# Patient Record
Sex: FEMALE | Race: WHITE | NOT HISPANIC OR LATINO | URBAN - METROPOLITAN AREA
[De-identification: names, ages, dates, MRNs, and addresses within clinical notes are randomized per-mention and may not be internally consistent; named-entity substitution may affect disease eponyms.]

---

## 2018-07-12 ENCOUNTER — RECORDS - HEALTHEAST (OUTPATIENT)
Dept: LAB | Facility: CLINIC | Age: 23
End: 2018-07-12

## 2018-07-13 LAB — C TRACH DNA SPEC QL PROBE+SIG AMP: NEGATIVE

## 2019-07-18 ENCOUNTER — RECORDS - HEALTHEAST (OUTPATIENT)
Dept: LAB | Facility: CLINIC | Age: 24
End: 2019-07-18

## 2019-07-19 LAB — C TRACH DNA SPEC QL PROBE+SIG AMP: NEGATIVE

## 2022-10-13 ENCOUNTER — LAB REQUISITION (OUTPATIENT)
Dept: LAB | Facility: CLINIC | Age: 27
End: 2022-10-13
Payer: COMMERCIAL

## 2022-10-13 DIAGNOSIS — Z01.419 ENCOUNTER FOR GYNECOLOGICAL EXAMINATION (GENERAL) (ROUTINE) WITHOUT ABNORMAL FINDINGS: ICD-10-CM

## 2022-10-13 PROCEDURE — G0145 SCR C/V CYTO,THINLAYER,RESCR: HCPCS | Mod: ORL | Performed by: PHYSICIAN ASSISTANT

## 2022-10-18 LAB
BKR LAB AP GYN ADEQUACY: NORMAL
BKR LAB AP GYN INTERPRETATION: NORMAL
BKR LAB AP HPV REFLEX: NORMAL
BKR LAB AP LMP: NORMAL
BKR LAB AP PREVIOUS ABNORMAL: NORMAL
PATH REPORT.COMMENTS IMP SPEC: NORMAL
PATH REPORT.COMMENTS IMP SPEC: NORMAL
PATH REPORT.RELEVANT HX SPEC: NORMAL

## 2023-01-31 ENCOUNTER — TRANSCRIBE ORDERS (OUTPATIENT)
Dept: OTHER | Age: 28
End: 2023-01-31

## 2023-01-31 DIAGNOSIS — R32 INCONTINENCE IN FEMALE: Primary | ICD-10-CM

## 2023-03-02 ENCOUNTER — THERAPY VISIT (OUTPATIENT)
Dept: PHYSICAL THERAPY | Facility: CLINIC | Age: 28
End: 2023-03-02
Attending: PHYSICIAN ASSISTANT
Payer: COMMERCIAL

## 2023-03-02 DIAGNOSIS — R32 INCONTINENCE IN FEMALE: ICD-10-CM

## 2023-03-02 DIAGNOSIS — M99.05 SOMATIC DYSFUNCTION OF PELVIC REGION: Primary | ICD-10-CM

## 2023-03-02 PROCEDURE — 97535 SELF CARE MNGMENT TRAINING: CPT | Mod: GP

## 2023-03-02 PROCEDURE — 97161 PT EVAL LOW COMPLEX 20 MIN: CPT | Mod: GP

## 2023-03-02 PROCEDURE — 97110 THERAPEUTIC EXERCISES: CPT | Mod: GP

## 2023-03-02 NOTE — PROGRESS NOTES
Physical Therapy Initial Evaluation  Subjective:  Joy reports noticing that her underwear has been damp after rock climbing and has felt UI when belaying. This started in Nov 2022, unsure of anything that could have caused it. She has been climbing w/out UI before then. She is indoor top-rope climbing at the gym for about 2 hr, leaking in the middle or more towards the end of 2 hr. Denies feeling a full bladder or urgency during. Is not necessarily happening w/ stress (such as a dyno or big push) during climbing.      Exercise: Climbing at VE 1x/wk, walks dog 2x/day, 10 min fitness workouts 2x/wk (HIIT, 5# weights). Does not leak w/ HIIT training  Goals: Not leak while rock climbing    Urination:  Do you leak on the way to the bathroom or with a strong urge to void? n   Do you leak with cough,sneeze, jumping, running? n  Any other activities that cause leaking?  Rock climbing  Do you have triggers that make you feel you can't wait to go to the bathroom?  What are they? n  Type of pad and number used per day? Did not answer  When you leak what is the amount? med  How long can you delay the need to urinate? An hour  Frequency of daytime urination: every 3-4 hours  Frequency of nighttime urination: 0  Can you stop the flow of urine when on the toilet? y  Is the volume of urine passed usually:  (8sec rule= 250ml with average bladder storing 400-600ml) avg  Do you strain to pass urine? n  Do you have a slow or hesitant urinary stream? n  Do you have difficulty initiating the urine stream? n  Is urination painful? n  How many bladder infections have you had in last 12 months? 0  Fluid intake(one glass is 8oz or one cup)  1-2glasses/day, small glass of OJ in AM. Occasional seltzer or juice in PM. 0 caffinated glasses/day  0 alcohol glasses/day.    Bowel habits:  Frequency of bowel movements? 1 times a day  Consistancy of stool?  Soft formed  Do you ignore the urge to defecate? n  Do you strain to pass stool? n    Pelvic  Pain:  Do you have any pelvic pain with intercourse, exams, use of tampons? n  Are you sexually active? y  Is initial penetration during intercourse painful? n  Is deeper penetration painful? n     Have you ever been worried for your physical safety? n  Have you practiced the PF(kegel) exercises for 4 or more weeks? n                            Objective:  System                                 Pelvic Dysfunction Evaluation:        Flexibility:  normal      Abdominal Wall:  normal  Diastasis Recti:  Negative  Trigger Points:  NA    Pelvic Clock Exam:  normal                External Assessment:  External assessment pelvic: absent knack w/ cough. Slight lift and descent w/ contract, but not full excursion.  Skin Condition:  Normal          Muscle Contraction/Perineal Mobility:  Slight lift, no urogential triangle descent  Internal Assessment:  Internal assessment pelvic: Good isolation of PFM w/ 7 sec hold. Normal LA mm tension palpated and no pain w/ exam. OI palpation held as pt had a menstrual disc in today. Absent knack w/ cough. Negative prolapse.    Contraction/Grade:  Fair squeeze, definite lift (3)          Additional History:    Number of Pregnancies: 0              Hip Evaluation  Hip PROM:  Hip PROM:  Left Hip:    Normal  Right Hip:  Normal                          Hip Strength:  Hip Strength:    Left:    Normal (Hip flexion, ER/IR, glute med/max all 4+/5)  Right:  Normal (Hip flexion, ER/IR, glute med/max all 4+/5)                              Functional Testing:          Quad:    Single leg squat:   Left:    Mild loss of control and femoral IR  Right:   Mild loss of control and femoral IR    Bilateral leg squat:  Normal control                  General     ROS    Assessment/Plan:    Patient is a 27 year old female with pelvic complaints.    Patient has the following significant findings with corresponding treatment plan.                Diagnosis 1:  NASREEN  Decreased strength - therapeutic exercise,  therapeutic activities and home program  Impaired muscle performance - biofeedback, electric stimulation and neuro re-education  Decreased function - therapeutic activities, home program and functional performance testing    Therapy Evaluation Codes:   1) History comprised of:   Personal factors that impact the plan of care:      None.    Comorbidity factors that impact the plan of care are:      Migraines/headaches.     Medications impacting care: None.  2) Examination of Body Systems comprised of:   Body structures and functions that impact the plan of care:      Hip, Knee, Lumbar spine and Pelvis.   Activity limitations that impact the plan of care are:      Sports and Stress incontinence.  3) Clinical presentation characteristics are:   Stable/Uncomplicated.  4) Decision-Making    Low complexity using standardized patient assessment instrument and/or measureable assessment of functional outcome.  Cumulative Therapy Evaluation is: Low complexity.    Previous and current functional limitations:  (See Goal Flow Sheet for this information)    Short term and Long term goals: (See Goal Flow Sheet for this information)     Communication ability:  Patient appears to be able to clearly communicate and understand verbal and written communication and follow directions correctly.  Treatment Explanation - The following has been discussed with the patient:   RX ordered/plan of care  Anticipated outcomes  Possible risks and side effects  This patient would benefit from PT intervention to resume normal activities.   Rehab potential is good.    Frequency:  2 X a month, once daily  Duration:  for 3 months  Discharge Plan:  Achieve all LTG.  Independent in home treatment program.  Reach maximal therapeutic benefit.    Please refer to the daily flowsheet for treatment today, total treatment time and time spent performing 1:1 timed codes.     Netta Garcia, PT, DPT

## 2023-03-10 NOTE — PROGRESS NOTES
Physical Therapy Initial Evaluation  Subjective:    Patient Health History           General health as reported by patient is excellent.  Pertinent medical history includes: migraines/headaches.   Red flags:  None as reported by patient.         Current medications:  Other. Other medications details: .       Primary job tasks include:  Prolonged standing.                                    Objective:  System    Physical Exam    General     ROS    Assessment/Plan:

## 2023-03-20 ENCOUNTER — THERAPY VISIT (OUTPATIENT)
Dept: PHYSICAL THERAPY | Facility: CLINIC | Age: 28
End: 2023-03-20
Payer: COMMERCIAL

## 2023-03-20 DIAGNOSIS — R32 INCONTINENCE IN FEMALE: Primary | ICD-10-CM

## 2023-03-20 DIAGNOSIS — M99.05 SOMATIC DYSFUNCTION OF PELVIC REGION: ICD-10-CM

## 2023-03-20 PROCEDURE — 97535 SELF CARE MNGMENT TRAINING: CPT | Mod: GP

## 2023-03-20 PROCEDURE — 97110 THERAPEUTIC EXERCISES: CPT | Mod: GP

## 2023-04-20 ENCOUNTER — THERAPY VISIT (OUTPATIENT)
Dept: PHYSICAL THERAPY | Facility: CLINIC | Age: 28
End: 2023-04-20
Payer: COMMERCIAL

## 2023-04-20 DIAGNOSIS — M99.05 SOMATIC DYSFUNCTION OF PELVIC REGION: ICD-10-CM

## 2023-04-20 DIAGNOSIS — R32 INCONTINENCE IN FEMALE: Primary | ICD-10-CM

## 2023-04-20 PROCEDURE — 97110 THERAPEUTIC EXERCISES: CPT | Mod: GP

## 2023-05-15 ENCOUNTER — THERAPY VISIT (OUTPATIENT)
Dept: PHYSICAL THERAPY | Facility: CLINIC | Age: 28
End: 2023-05-15
Payer: COMMERCIAL

## 2023-05-15 DIAGNOSIS — M99.05 SOMATIC DYSFUNCTION OF PELVIC REGION: Primary | ICD-10-CM

## 2023-05-15 DIAGNOSIS — R32 INCONTINENCE IN FEMALE: ICD-10-CM

## 2023-05-15 PROCEDURE — 97110 THERAPEUTIC EXERCISES: CPT | Mod: GP

## 2023-05-15 PROCEDURE — 97112 NEUROMUSCULAR REEDUCATION: CPT | Mod: GP

## 2023-06-26 ENCOUNTER — THERAPY VISIT (OUTPATIENT)
Dept: PHYSICAL THERAPY | Facility: CLINIC | Age: 28
End: 2023-06-26
Payer: COMMERCIAL

## 2023-06-26 DIAGNOSIS — M99.05 SOMATIC DYSFUNCTION OF PELVIC REGION: Primary | ICD-10-CM

## 2023-06-26 DIAGNOSIS — R32 INCONTINENCE IN FEMALE: ICD-10-CM

## 2023-06-26 PROCEDURE — 97110 THERAPEUTIC EXERCISES: CPT | Mod: GP

## 2023-06-26 NOTE — PROGRESS NOTES
PLAN  Cont w/ HEP independently. Hold on further PT and have pt consult w/ urogyn or gyn if continues to have leakage w/ sport.    Beginning/End Dates of Progress Note Reporting Period:  03/02/23 to 06/26/2023    Referring Provider:  Lucretia Meza     06/26/23 0500   Appointment Info   Signing clinician's name / credentials Netta Garica, PT, DPT   Total/Authorized Visits 12 per MD order   Visits Used 5   Medical Diagnosis Incontinence in female   PT Tx Diagnosis NASREEN   Progress Note/Certification   Therapy Frequency 1-2x/mo   Predicted Duration 3 mo   Progress Note Due Date 09/04/23   Progress Note Completed Date 03/02/23   PT Goal 1   Goal Identifier ltg   Goal Description Pt will report no leaking w/ rock climbing   Rationale   (continence throughout the day;continence throughout the night;continence during work;for healthy hygiene and to prevent skin breakdown)   Goal Progress Continues to leak when rock climbing despite good adherence to HEP. More time needed to meet goals   Target Date 05/25/23   Subjective Report   Subjective Report Reports has not noticed any change in function- still leaks urine w/ rock climbing. She cannot always feels when it happens. Thinks she leaks ~4 small amounts each time she is climbing. No longer leaks when belaying. She denies UI with any other activities except rock climbing. She has kept up with her strengthening HEP, as well as yoga and stretches.   Objective Measures   Objective Measures Objective Measure 1;Objective Measure 2;Objective Measure 3   Objective Measure 1   Objective Measure Pelvic floor   Details Able to perform 10 sec holds in variety of positions (sitting, standing, squatting) w/ no impairments noted   Objective Measure 2   Objective Measure Biofeedback   Details From last visit on 5/15:  20 uV (sitting) 10 uV (standing) holds x10 sec. Good activation and isolation of PFM, but delayed relaxation noted, taking 5-10 sec   Objective Measure 3   Objective  "Measure Vaginal exam from 4/20   Details PERF: 4, 10, -, 7. Improved strength/endurance from IE. Mild delay in relaxation following contraction, but had no increased mm tension in LA and no pain w/ exam   Treatment Interventions (PT)   Interventions Therapeutic Procedure/Exercise   Therapeutic Procedure/Exercise   PTRx Ther Proc 1 Squat Pelvic Floor   PTRx Ther Proc 1 - Details verbal rev   PTRx Ther Proc 2 Pelvic Floor Muscle Strengthening Basic   PTRx Ther Proc 2 - Details verbal rev   PTRx Ther Proc 3 Pelvic Floor Muscle Strengthening Quick Flicks   PTRx Ther Proc 3 - Details verbal rev   PTRx Ther Proc 4 Side Stepping With Theraband   PTRx Ther Proc 4 - Details verbal rev   PTRx Ther Proc 5 Heel Drop with Kegel   PTRx Ther Proc 5 - Details verbal rev   PTRx Ther Proc 6 Single Leg Balance with Kegel    PTRx Ther Proc 6 - Details verbal rev   PTRx Ther Proc 7 Dumbbell Russian Dead Lifts - Single Leg   PTRx Ther Proc 7 - Details verbal rev   PTRx Ther Proc 8 Bridging Pelvic Floor    PTRx Ther Proc 8 - Details verbal rev   PTRx Ther Proc 9 Prone Plank   PTRx Ther Proc 9 - Details verbal rev   PTRx Ther Proc 10 Side Plank   PTRx Ther Proc 10 - Details verbal rev   PTRx Ther Proc 11 Happy Baby   PTRx Ther Proc 11 - Details verbal rev   PTRx Ther Proc 12 Pretzel Stretch   PTRx Ther Proc 12 - Details verbal rev   PTRx Ther Proc 13 Extended [place] Pose   PTRx Ther Proc 13 - Details Performed 10\" pelvic floor contraction x10    PTRx Ther Proc 14 All 4s Cat Cow   PTRx Ther Proc 14 - Details Verbal rev   PTRx Ther Proc 15 Men's Health Pelvic Floor Bulge Position   PTRx Ther Proc 15 - Details Performed 10\" pelvic floor contraction x10    Therapeutic Procedures: strength, endurance, ROM, flexibillity minutes ([id number]) 33   Ther Proc 1 Hanging   Ther Proc 1 - Details Hangs from bar paired w/ 10\" pelvic floor x5, also performed hangs w/ one hip flexed up during pelvic floor hold 3x10\" each side to mimic climbing positions for " functional strength   Therapeutic Procedures Ther Proc 2   PTRx Ther Proc 16 Pediatric Breathing with Weight   PTRx Ther Proc 16 - Details No Notes   PTRx Ther Proc 17 Shoulder Theraband Low Row/[place]   PTRx Ther Proc 17 - Details Blue TB x10 paired w/ pelvic floor cues for form   Ther Proc 2 POC   Ther Proc 2 - Details Discussed lack of progress and next steps in her POC. Recommended continue w/ new HEP additions and reach out to primary doc about seeing urogyn or gyn if she continues to have urine leakage. Names of urogyn and gyn given as next step for further assessment. All questions answered and pt in agreement w/ POC   Therapeutic Activity   PTRx Ther Act 1 Bladder Diary   PTRx Ther Act 1 - Details No Notes   Neuromuscular Re-education   PTRx Neuro Re-ed 1 PNF Diagonal 2 with Resistance Band   PTRx Neuro Re-ed 1 - Details Blue TB x10 each side cues for form   PTRx Neuro Re-ed 2 Pallof Press   PTRx Neuro Re-ed 2 - Details Blue TB x10 each side cues for form   Education   Learner/Method Patient;Listening;Demonstration;No Barriers to Learning;Pictures/Video   Plan   Updates to plan of care will hold further PT and pt will contact clinic to schedule more if indicated   Plan for next session Pt to cont w/ HEP and see urogyn/gyn for further assessment if needed   Total Session Time   Timed Code Treatment Minutes 33   Total Treatment Time (sum of timed and untimed services) 33

## 2023-08-03 PROBLEM — R32 INCONTINENCE IN FEMALE: Status: RESOLVED | Noted: 2023-03-02 | Resolved: 2023-08-03

## 2023-08-03 PROBLEM — M99.05 SOMATIC DYSFUNCTION OF PELVIC REGION: Status: RESOLVED | Noted: 2023-03-02 | Resolved: 2023-08-03

## 2023-10-17 ENCOUNTER — LAB REQUISITION (OUTPATIENT)
Dept: LAB | Facility: CLINIC | Age: 28
End: 2023-10-17

## 2023-10-17 DIAGNOSIS — R42 DIZZINESS AND GIDDINESS: ICD-10-CM

## 2023-10-17 LAB
ALBUMIN SERPL BCG-MCNC: 4.5 G/DL (ref 3.5–5.2)
ALP SERPL-CCNC: 59 U/L (ref 35–104)
ALT SERPL W P-5'-P-CCNC: 13 U/L (ref 0–50)
ANION GAP SERPL CALCULATED.3IONS-SCNC: 10 MMOL/L (ref 7–15)
AST SERPL W P-5'-P-CCNC: 20 U/L (ref 0–45)
BILIRUB SERPL-MCNC: 0.4 MG/DL
BUN SERPL-MCNC: 14.5 MG/DL (ref 6–20)
CALCIUM SERPL-MCNC: 9.6 MG/DL (ref 8.6–10)
CHLORIDE SERPL-SCNC: 102 MMOL/L (ref 98–107)
CREAT SERPL-MCNC: 0.85 MG/DL (ref 0.51–0.95)
DEPRECATED HCO3 PLAS-SCNC: 28 MMOL/L (ref 22–29)
EGFRCR SERPLBLD CKD-EPI 2021: >90 ML/MIN/1.73M2
GLUCOSE SERPL-MCNC: 103 MG/DL (ref 70–99)
POTASSIUM SERPL-SCNC: 3.7 MMOL/L (ref 3.4–5.3)
PROT SERPL-MCNC: 7.3 G/DL (ref 6.4–8.3)
SODIUM SERPL-SCNC: 140 MMOL/L (ref 135–145)
TSH SERPL DL<=0.005 MIU/L-ACNC: 0.96 UIU/ML (ref 0.3–4.2)

## 2023-10-17 PROCEDURE — 84443 ASSAY THYROID STIM HORMONE: CPT | Performed by: PHYSICIAN ASSISTANT

## 2023-10-17 PROCEDURE — 80053 COMPREHEN METABOLIC PANEL: CPT | Performed by: PHYSICIAN ASSISTANT

## 2024-11-07 ENCOUNTER — LAB REQUISITION (OUTPATIENT)
Dept: LAB | Facility: CLINIC | Age: 29
End: 2024-11-07

## 2024-11-07 DIAGNOSIS — Z11.3 ENCOUNTER FOR SCREENING FOR INFECTIONS WITH A PREDOMINANTLY SEXUAL MODE OF TRANSMISSION: ICD-10-CM

## 2024-11-07 DIAGNOSIS — L70.0 ACNE VULGARIS: ICD-10-CM

## 2024-11-07 PROCEDURE — 80048 BASIC METABOLIC PNL TOTAL CA: CPT | Performed by: PHYSICIAN ASSISTANT

## 2024-11-07 PROCEDURE — 87491 CHLMYD TRACH DNA AMP PROBE: CPT | Performed by: PHYSICIAN ASSISTANT

## 2024-11-08 LAB
ANION GAP SERPL CALCULATED.3IONS-SCNC: 11 MMOL/L (ref 7–15)
BUN SERPL-MCNC: 20.3 MG/DL (ref 6–20)
C TRACH DNA SPEC QL PROBE+SIG AMP: NEGATIVE
CALCIUM SERPL-MCNC: 9.5 MG/DL (ref 8.8–10.4)
CHLORIDE SERPL-SCNC: 101 MMOL/L (ref 98–107)
CREAT SERPL-MCNC: 0.98 MG/DL (ref 0.51–0.95)
EGFRCR SERPLBLD CKD-EPI 2021: 80 ML/MIN/1.73M2
GLUCOSE SERPL-MCNC: 93 MG/DL (ref 70–99)
HCO3 SERPL-SCNC: 26 MMOL/L (ref 22–29)
N GONORRHOEA DNA SPEC QL NAA+PROBE: NEGATIVE
POTASSIUM SERPL-SCNC: 4.3 MMOL/L (ref 3.4–5.3)
SODIUM SERPL-SCNC: 138 MMOL/L (ref 135–145)

## 2025-02-25 ENCOUNTER — LAB REQUISITION (OUTPATIENT)
Dept: LAB | Facility: CLINIC | Age: 30
End: 2025-02-25

## 2025-02-25 DIAGNOSIS — Z01.818 ENCOUNTER FOR OTHER PREPROCEDURAL EXAMINATION: ICD-10-CM

## 2025-02-25 PROCEDURE — 84132 ASSAY OF SERUM POTASSIUM: CPT | Performed by: PHYSICIAN ASSISTANT

## 2025-02-26 LAB — POTASSIUM SERPL-SCNC: 4.2 MMOL/L (ref 3.4–5.3)
